# Patient Record
Sex: FEMALE | Race: WHITE | NOT HISPANIC OR LATINO | Employment: UNEMPLOYED | URBAN - METROPOLITAN AREA
[De-identification: names, ages, dates, MRNs, and addresses within clinical notes are randomized per-mention and may not be internally consistent; named-entity substitution may affect disease eponyms.]

---

## 2023-03-03 ENCOUNTER — TELEPHONE (OUTPATIENT)
Dept: CARDIOLOGY | Facility: MEDICAL CENTER | Age: 63
End: 2023-03-03

## 2023-03-03 NOTE — TELEPHONE ENCOUNTER
Called patient to request records for NP appointment with [facility]. Called to confirm if this will be first time patient is seeing a cardiologist and if the patient has had any recent cardiac imaging, testing, or lab work done outside of [facility]. No answer, unable to LVM due to mailbox being full.   Per referral to cardiology, patient had cardiac imaging/ testing done at [facility]. All records requested from [facility] at fax # [phone].

## 2023-03-09 ENCOUNTER — OFFICE VISIT (OUTPATIENT)
Dept: CARDIOLOGY | Facility: PHYSICIAN GROUP | Age: 63
End: 2023-03-09
Payer: MEDICAID

## 2023-03-09 VITALS
BODY MASS INDEX: 36.88 KG/M2 | DIASTOLIC BLOOD PRESSURE: 78 MMHG | HEIGHT: 67 IN | RESPIRATION RATE: 18 BRPM | SYSTOLIC BLOOD PRESSURE: 130 MMHG | OXYGEN SATURATION: 97 % | HEART RATE: 98 BPM | WEIGHT: 235 LBS

## 2023-03-09 DIAGNOSIS — I10 PRIMARY HYPERTENSION: ICD-10-CM

## 2023-03-09 DIAGNOSIS — I48.91 HYPERCOAGULABILITY DUE TO ATRIAL FIBRILLATION (HCC): ICD-10-CM

## 2023-03-09 DIAGNOSIS — D68.69 HYPERCOAGULABILITY DUE TO ATRIAL FIBRILLATION (HCC): ICD-10-CM

## 2023-03-09 DIAGNOSIS — F15.10 METHAMPHETAMINE ABUSE (HCC): ICD-10-CM

## 2023-03-09 DIAGNOSIS — I48.91 ATRIAL FIBRILLATION, UNSPECIFIED TYPE (HCC): ICD-10-CM

## 2023-03-09 DIAGNOSIS — Z72.0 TOBACCO USE: ICD-10-CM

## 2023-03-09 PROCEDURE — 99205 OFFICE O/P NEW HI 60 MIN: CPT | Mod: 25 | Performed by: INTERNAL MEDICINE

## 2023-03-09 PROCEDURE — 99406 BEHAV CHNG SMOKING 3-10 MIN: CPT | Performed by: INTERNAL MEDICINE

## 2023-03-09 RX ORDER — RIVAROXABAN 20 MG/1
20 TABLET, FILM COATED ORAL
COMMUNITY
Start: 2023-02-17 | End: 2023-03-09 | Stop reason: SDUPTHER

## 2023-03-09 RX ORDER — METOPROLOL SUCCINATE 25 MG/1
25 TABLET, EXTENDED RELEASE ORAL DAILY
Qty: 90 TABLET | Refills: 3 | Status: SHIPPED | OUTPATIENT
Start: 2023-03-09 | End: 2024-01-16 | Stop reason: SDUPTHER

## 2023-03-09 RX ORDER — PANTOPRAZOLE SODIUM 40 MG/1
40 TABLET, DELAYED RELEASE ORAL DAILY
COMMUNITY
Start: 2023-03-01

## 2023-03-09 RX ORDER — RIVAROXABAN 20 MG/1
20 TABLET, FILM COATED ORAL
Qty: 90 TABLET | Refills: 3 | Status: SHIPPED | OUTPATIENT
Start: 2023-03-09 | End: 2024-01-16 | Stop reason: SDUPTHER

## 2023-03-09 RX ORDER — AMLODIPINE BESYLATE 5 MG/1
5 TABLET ORAL
COMMUNITY
Start: 2023-02-01 | End: 2024-01-16 | Stop reason: SDUPTHER

## 2023-03-09 ASSESSMENT — ENCOUNTER SYMPTOMS
DIARRHEA: 0
DECREASED APPETITE: 0
DEPRESSION: 0
PALPITATIONS: 1
ABDOMINAL PAIN: 0
CONSTIPATION: 0
DYSPNEA ON EXERTION: 0
SHORTNESS OF BREATH: 0
PND: 0
WEIGHT LOSS: 0
VOMITING: 0
WEIGHT GAIN: 0
DIZZINESS: 0
NEAR-SYNCOPE: 0
HEARTBURN: 0
BLURRED VISION: 0
COUGH: 0
CLAUDICATION: 0
SYNCOPE: 0
FEVER: 0
BACK PAIN: 0
FLANK PAIN: 0
IRREGULAR HEARTBEAT: 0
ALTERED MENTAL STATUS: 0
ORTHOPNEA: 0
NAUSEA: 0

## 2023-03-09 NOTE — PROGRESS NOTES
"Cardiology Note    Chief Complaint   Patient presents with    Atrial Fibrillation       History of Present Illness: Miriam Choi is a 62 y.o. female PMH HTN who presents for initial visit. Referred by Dr Spivey for atrial fibrillation.     Per record from outpatient PCP visit ED visit 2/2023 for hypertension found AF which self converted to sinus.    Since visit still having some palpitations. Associated with fatigue. Palpitations a few times per day. Usually about a minute. Continues to smoke 1/2 ppd with history 30 pack years. Last used methamphetamines 3 days ago. Prior to that was sober and \"doing good.\" No other toxic social habits. No relevant family history.     Review of Systems   Constitutional: Negative for decreased appetite, fever, malaise/fatigue, weight gain and weight loss.   HENT:  Negative for congestion and nosebleeds.    Eyes:  Negative for blurred vision.   Cardiovascular:  Positive for palpitations. Negative for chest pain, claudication, dyspnea on exertion, irregular heartbeat, leg swelling, near-syncope, orthopnea, paroxysmal nocturnal dyspnea and syncope.   Respiratory:  Negative for cough and shortness of breath.    Endocrine: Negative for cold intolerance and heat intolerance.   Skin:  Negative for rash.   Musculoskeletal:  Negative for back pain.   Gastrointestinal:  Negative for abdominal pain, constipation, diarrhea, heartburn, melena, nausea and vomiting.   Genitourinary:  Negative for dysuria, flank pain and hematuria.   Neurological:  Negative for dizziness.   Psychiatric/Behavioral:  Negative for altered mental status and depression.          History reviewed. No pertinent surgical history.      Current Outpatient Medications   Medication Sig Dispense Refill    amLODIPine (NORVASC) 5 MG Tab Take 5 mg by mouth every day.      pantoprazole (PROTONIX) 40 MG Tablet Delayed Response Take 40 mg by mouth every day.      Carboxymethylcellulose Sodium (ARTIFICIAL TEARS OP) Administer  into " "affected eye(s).      NON SPECIFIED Indications: Biofreeze pain rub      metoprolol SR (TOPROL XL) 25 MG TABLET SR 24 HR Take 1 Tablet by mouth every day. 90 Tablet 3    XARELTO 20 MG Tab tablet Take 1 Tablet by mouth with dinner. 90 Tablet 3     No current facility-administered medications for this visit.         No Known Allergies      History reviewed. No pertinent family history.      Social History     Socioeconomic History    Marital status: Single     Spouse name: Not on file    Number of children: Not on file    Years of education: Not on file    Highest education level: Not on file   Occupational History    Not on file   Tobacco Use    Smoking status: Every Day     Years: 45.00     Types: Cigarettes    Smokeless tobacco: Never   Substance and Sexual Activity    Alcohol use: Yes     Alcohol/week: 1.2 oz     Types: 2 Standard drinks or equivalent per week    Drug use: Yes     Types: Methamphetamines, Marijuana     Comment: occ meth use    Sexual activity: Not on file   Other Topics Concern    Not on file   Social History Narrative    Not on file     Social Determinants of Health     Financial Resource Strain: Not on file   Food Insecurity: Not on file   Transportation Needs: Not on file   Physical Activity: Not on file   Stress: Not on file   Social Connections: Not on file   Intimate Partner Violence: Not on file   Housing Stability: Not on file         Physical Exam:  Ambulatory Vitals  /78 (BP Location: Left arm, Patient Position: Sitting, BP Cuff Size: Adult)   Pulse 98   Resp 18   Ht 1.702 m (5' 7\")   Wt 107 kg (235 lb)   SpO2 97%    BP Readings from Last 4 Encounters:   03/09/23 130/78     Weight/BMI:   Vitals:    03/09/23 1416   BP: 130/78   Weight: 107 kg (235 lb)   Height: 1.702 m (5' 7\")    Body mass index is 36.81 kg/m².  Wt Readings from Last 4 Encounters:   03/09/23 107 kg (235 lb)       Physical Exam  Constitutional:       General: She is not in acute distress.  HENT:      Head: " Normocephalic and atraumatic.   Eyes:      Conjunctiva/sclera: Conjunctivae normal.      Pupils: Pupils are equal, round, and reactive to light.   Neck:      Vascular: No JVD.   Cardiovascular:      Rate and Rhythm: Normal rate and regular rhythm.      Heart sounds: Normal heart sounds. No murmur heard.    No friction rub. No gallop.   Pulmonary:      Effort: Pulmonary effort is normal. No respiratory distress.      Breath sounds: Normal breath sounds. No wheezing or rales.   Chest:      Chest wall: No tenderness.   Abdominal:      General: Bowel sounds are normal. There is no distension.      Palpations: Abdomen is soft.   Musculoskeletal:      Cervical back: Normal range of motion and neck supple.   Skin:     General: Skin is warm and dry.   Neurological:      Mental Status: She is alert and oriented to person, place, and time.   Psychiatric:         Mood and Affect: Affect normal.         Judgment: Judgment normal.       Lab Data Review:  No results found for: CHOLSTRLTOT, LDL, HDL, TRIGLYCERIDE    No results found for: SODIUM, POTASSIUM, CHLORIDE, CO2, GLUCOSE, BUN, CREATININE, BUNCREATRAT, GLOMRATE  CrCl cannot be calculated (No successful lab value found.).  No results found for: ALKPHOSPHAT, ASTSGOT, ALTSGPT, TBILIRUBIN   No results found for: WBC, HCT  No results found for: HBA1C  No components found for: TROP      Cardiac Imaging and Procedures Review:      EKG 3/9/23 interpreted by me sinus arrhythmia, low voltage    Medical Decision Making:  Problem List Items Addressed This Visit       Atrial fibrillation (HCC)    Relevant Medications    amLODIPine (NORVASC) 5 MG Tab    metoprolol SR (TOPROL XL) 25 MG TABLET SR 24 HR    XARELTO 20 MG Tab tablet    Other Relevant Orders    EKG    EC-ECHOCARDIOGRAM COMPLETE W/O CONT    Holter Monitor Study    Primary hypertension    Relevant Medications    amLODIPine (NORVASC) 5 MG Tab    metoprolol SR (TOPROL XL) 25 MG TABLET SR 24 HR    XARELTO 20 MG Tab tablet     Methamphetamine abuse (HCC)    Hypercoagulability due to atrial fibrillation (HCC)    Relevant Medications    amLODIPine (NORVASC) 5 MG Tab    metoprolol SR (TOPROL XL) 25 MG TABLET SR 24 HR    XARELTO 20 MG Tab tablet    Tobacco use     Paroxysmal AF - add metoprolol. Continue doac for cva prevention; chadsvasc 2. Check TTE and event monitor.    HTN - goal 120/80. Continue amlodipine.    Tobacco use - discussed 4 min. She will attempt to quit. Not clear that she is ready.     It was my pleasure to meet with Ms. Choi.    A total of 67 minutes of time was spent on day of encounter reviewing medical record, performing history and examination, counseling, ordering medication/test/consults and documentation.

## 2023-03-15 ENCOUNTER — NON-PROVIDER VISIT (OUTPATIENT)
Dept: CARDIOLOGY | Facility: MEDICAL CENTER | Age: 63
End: 2023-03-15
Payer: MEDICAID

## 2023-03-15 DIAGNOSIS — I49.1 APC (ATRIAL PREMATURE CONTRACTIONS): ICD-10-CM

## 2023-03-15 DIAGNOSIS — I48.91 ATRIAL FIBRILLATION, UNSPECIFIED TYPE (HCC): ICD-10-CM

## 2023-03-15 DIAGNOSIS — I49.3 PVC'S (PREMATURE VENTRICULAR CONTRACTIONS): ICD-10-CM

## 2023-03-15 NOTE — PROGRESS NOTES
Home enrollment completed in the 14 day [employer] heart monitoring program, per Fahad Rogers M.D.  Monitor will be shipped to patient via [facility].  >Pending Baseline.  >Pending EOS.

## 2023-03-17 ENCOUNTER — TELEPHONE (OUTPATIENT)
Dept: CARDIOLOGY | Facility: MEDICAL CENTER | Age: 63
End: 2023-03-17

## 2023-03-17 NOTE — TELEPHONE ENCOUNTER
----- Message from Yolanda Sarabia sent at 3/17/2023  1:31 PM PDT -----  Regarding: [employer] Patch order  David Abdullahi, can you please change this order to a [employer] per Insurance. I would think 14 days would still be suffice, same charge 3-30 days. The referral dept is awaiting this change. Thank you, miss you already.

## 2023-04-03 ENCOUNTER — TELEPHONE (OUTPATIENT)
Dept: CARDIOLOGY | Facility: MEDICAL CENTER | Age: 63
End: 2023-04-03

## 2023-04-03 NOTE — TELEPHONE ENCOUNTER
"Phone Number Called: [phone]    Call outcome: Spoke to patient regarding message below.    Message:   Spoke to pt and she stated that her heart was \"racing\" and she was breathing \"heavy\" at the time that it happened. She states that she is feeling much better now and is not having any symptoms. She stated that she is in the middle of moving right now, and maybe it was related to that. She states that she is continuing to smoke about 1/2 ppd and denies methamphetamine use recently since last seeing [facility] 03/09/2023. Confirms that she is still taking her xarelto, metoprolol, and amlodipine as prescribed.       To DA for VR per care team ([facility] is OOO until 4/11): please review monitor results and advise if any further recommendations in VR's absence. Thank you  "

## 2023-04-04 NOTE — TELEPHONE ENCOUNTER
Reviewed.  Since this was a brief episode, no changes to plan of care.  Continue anticoagulation.  Thanks.

## 2023-04-04 NOTE — TELEPHONE ENCOUNTER
Phone Number Called: [phone]    Call outcome: Spoke to patient regarding message below.    Message:   Relayed DA's recommendation to keep taking current anticoagulation and cardiac meds as prescribed and no changes to plan of care. Pt verbalized understanding and was very appreciative of call back.

## 2023-04-06 ENCOUNTER — TELEPHONE (OUTPATIENT)
Dept: CARDIOLOGY | Facility: MEDICAL CENTER | Age: 63
End: 2023-04-06

## 2023-04-06 PROCEDURE — 93268 ECG RECORD/REVIEW: CPT | Performed by: INTERNAL MEDICINE

## 2023-07-14 ENCOUNTER — HOSPITAL ENCOUNTER (OUTPATIENT)
Dept: RADIOLOGY | Facility: MEDICAL CENTER | Age: 63
End: 2023-07-14
Attending: INTERNAL MEDICINE
Payer: MEDICAID

## 2023-07-21 ENCOUNTER — HOSPITAL ENCOUNTER (OUTPATIENT)
Dept: RADIOLOGY | Facility: MEDICAL CENTER | Age: 63
End: 2023-07-21
Attending: INTERNAL MEDICINE
Payer: MEDICAID

## 2023-07-21 ENCOUNTER — HOSPITAL ENCOUNTER (OUTPATIENT)
Dept: CARDIOLOGY | Facility: MEDICAL CENTER | Age: 63
End: 2023-07-21
Attending: INTERNAL MEDICINE
Payer: MEDICAID

## 2023-07-21 DIAGNOSIS — Z12.31 VISIT FOR SCREENING MAMMOGRAM: ICD-10-CM

## 2023-07-21 DIAGNOSIS — I48.91 ATRIAL FIBRILLATION, UNSPECIFIED TYPE (HCC): ICD-10-CM

## 2023-07-21 PROCEDURE — 77063 BREAST TOMOSYNTHESIS BI: CPT

## 2023-07-21 PROCEDURE — 93306 TTE W/DOPPLER COMPLETE: CPT

## 2023-07-24 LAB
LV EJECT FRACT  99904: 60
LV EJECT FRACT MOD 2C 99903: 65.03
LV EJECT FRACT MOD 4C 99902: 54.82
LV EJECT FRACT MOD BP 99901: 60.69

## 2023-07-24 PROCEDURE — 93306 TTE W/DOPPLER COMPLETE: CPT | Mod: 26 | Performed by: INTERNAL MEDICINE

## 2024-01-16 DIAGNOSIS — I48.91 ATRIAL FIBRILLATION, UNSPECIFIED TYPE (HCC): ICD-10-CM

## 2024-01-16 DIAGNOSIS — I10 PRIMARY HYPERTENSION: ICD-10-CM

## 2024-01-16 RX ORDER — METOPROLOL SUCCINATE 25 MG/1
25 TABLET, EXTENDED RELEASE ORAL DAILY
Qty: 90 TABLET | Refills: 0 | Status: SHIPPED | OUTPATIENT
Start: 2024-01-16

## 2024-01-16 RX ORDER — RIVAROXABAN 20 MG/1
20 TABLET, FILM COATED ORAL
Qty: 90 TABLET | Refills: 0 | Status: SHIPPED | OUTPATIENT
Start: 2024-01-16

## 2024-01-16 RX ORDER — AMLODIPINE BESYLATE 5 MG/1
5 TABLET ORAL
Qty: 90 TABLET | Refills: 0 | Status: SHIPPED | OUTPATIENT
Start: 2024-01-16

## 2024-04-29 ENCOUNTER — OFFICE VISIT (OUTPATIENT)
Dept: CARDIOLOGY | Facility: PHYSICIAN GROUP | Age: 64
End: 2024-04-29
Payer: MEDICAID

## 2024-04-29 VITALS
RESPIRATION RATE: 19 BRPM | BODY MASS INDEX: 36.41 KG/M2 | WEIGHT: 232 LBS | HEIGHT: 67 IN | OXYGEN SATURATION: 96 % | DIASTOLIC BLOOD PRESSURE: 60 MMHG | HEART RATE: 93 BPM | SYSTOLIC BLOOD PRESSURE: 138 MMHG

## 2024-04-29 DIAGNOSIS — I48.91 ATRIAL FIBRILLATION, UNSPECIFIED TYPE (HCC): ICD-10-CM

## 2024-04-29 DIAGNOSIS — I10 PRIMARY HYPERTENSION: ICD-10-CM

## 2024-04-29 RX ORDER — HYDROCHLOROTHIAZIDE 25 MG/1
25 TABLET ORAL DAILY
Qty: 90 TABLET | Refills: 3 | Status: SHIPPED | OUTPATIENT
Start: 2024-04-29

## 2024-04-29 ASSESSMENT — ENCOUNTER SYMPTOMS
ALTERED MENTAL STATUS: 0
DIARRHEA: 0
DYSPNEA ON EXERTION: 0
CONSTIPATION: 0
WEIGHT GAIN: 0
COUGH: 0
SHORTNESS OF BREATH: 0
VOMITING: 0
HEARTBURN: 0
NEAR-SYNCOPE: 0
NAUSEA: 0
PND: 0
DEPRESSION: 0
PALPITATIONS: 1
FLANK PAIN: 0
ORTHOPNEA: 0
BLURRED VISION: 0
SYNCOPE: 0
ABDOMINAL PAIN: 0
WEIGHT LOSS: 0
DIZZINESS: 0
FEVER: 0
IRREGULAR HEARTBEAT: 0
BACK PAIN: 0
CLAUDICATION: 0
DECREASED APPETITE: 0

## 2024-04-29 NOTE — PROGRESS NOTES
Cardiology Note    Chief Complaint   Patient presents with    Atrial Fibrillation       History of Present Illness: Miriam Choi is a 63 y.o. female PMH HTN, paroxysmal AF, tobacco use who presents for follow up visit. Initially referred by Dr Spivey for atrial fibrillation.     Without cardiac complaints. She stopped her anticoagulation temporarily however does plan to resume. Has been having some leg edema for some time which seems to persist. No associated symptoms; especially no heart failure symptoms. Tragically she found her son hanged himself in back yard today. She has family for support.     Per record from outpatient PCP visit ED visit 2/2023 for hypertension found AF which self converted to sinus.      Review of Systems   Constitutional: Negative for decreased appetite, fever, malaise/fatigue, weight gain and weight loss.   HENT:  Negative for congestion and nosebleeds.    Eyes:  Negative for blurred vision.   Cardiovascular:  Positive for palpitations. Negative for chest pain, claudication, dyspnea on exertion, irregular heartbeat, leg swelling, near-syncope, orthopnea, paroxysmal nocturnal dyspnea and syncope.   Respiratory:  Negative for cough and shortness of breath.    Endocrine: Negative for cold intolerance and heat intolerance.   Skin:  Negative for rash.   Musculoskeletal:  Negative for back pain.   Gastrointestinal:  Negative for abdominal pain, constipation, diarrhea, heartburn, melena, nausea and vomiting.   Genitourinary:  Negative for dysuria, flank pain and hematuria.   Neurological:  Negative for dizziness.   Psychiatric/Behavioral:  Negative for altered mental status and depression.            History reviewed. No pertinent surgical history.      Current Outpatient Medications   Medication Sig Dispense Refill    hydroCHLOROthiazide 25 MG Tab Take 1 Tablet by mouth every day. 90 Tablet 3    amLODIPine (NORVASC) 5 MG Tab Take 1 Tablet by mouth every day. 90 Tablet 0    metoprolol SR  "(TOPROL XL) 25 MG TABLET SR 24 HR Take 1 Tablet by mouth every day. 90 Tablet 0    pantoprazole (PROTONIX) 40 MG Tablet Delayed Response Take 40 mg by mouth every day.      Carboxymethylcellulose Sodium (ARTIFICIAL TEARS OP) Administer  into affected eye(s).      XARELTO 20 MG Tab tablet Take 1 Tablet by mouth with dinner. (Patient not taking: Reported on 4/29/2024) 90 Tablet 0    NON SPECIFIED Indications: Biofreeze pain rub       No current facility-administered medications for this visit.         No Known Allergies      History reviewed. No pertinent family history.      Social History     Socioeconomic History    Marital status: Single     Spouse name: Not on file    Number of children: Not on file    Years of education: Not on file    Highest education level: Not on file   Occupational History    Not on file   Tobacco Use    Smoking status: Every Day     Types: Cigarettes    Smokeless tobacco: Never   Substance and Sexual Activity    Alcohol use: Yes     Alcohol/week: 1.2 oz     Types: 2 Standard drinks or equivalent per week    Drug use: Yes     Types: Methamphetamines, Marijuana     Comment: occ meth use    Sexual activity: Not on file   Other Topics Concern    Not on file   Social History Narrative    Not on file     Social Determinants of Health     Financial Resource Strain: Not on file   Food Insecurity: Not on file   Transportation Needs: Not on file   Physical Activity: Not on file   Stress: Not on file   Social Connections: Not on file   Intimate Partner Violence: Not on file   Housing Stability: Not on file         Physical Exam:  Ambulatory Vitals  /60 (BP Location: Left arm, Patient Position: Sitting, BP Cuff Size: Adult)   Pulse 93   Resp 19   Ht 1.702 m (5' 7\")   Wt 105 kg (232 lb)   SpO2 96%    BP Readings from Last 4 Encounters:   04/29/24 138/60   03/09/23 130/78     Weight/BMI:   Vitals:    04/29/24 1511   BP: 138/60   Weight: 105 kg (232 lb)   Height: 1.702 m (5' 7\")    Body mass " "index is 36.34 kg/m².  Wt Readings from Last 4 Encounters:   04/29/24 105 kg (232 lb)   03/09/23 107 kg (235 lb)       Physical Exam  Constitutional:       General: She is not in acute distress.  HENT:      Head: Normocephalic and atraumatic.   Eyes:      Conjunctiva/sclera: Conjunctivae normal.      Pupils: Pupils are equal, round, and reactive to light.   Neck:      Vascular: No JVD.   Cardiovascular:      Rate and Rhythm: Normal rate and regular rhythm.      Heart sounds: Normal heart sounds. No murmur heard.     No friction rub. No gallop.   Pulmonary:      Effort: Pulmonary effort is normal. No respiratory distress.      Breath sounds: Normal breath sounds. No wheezing or rales.   Chest:      Chest wall: No tenderness.   Abdominal:      General: Bowel sounds are normal. There is no distension.      Palpations: Abdomen is soft.   Musculoskeletal:      Cervical back: Normal range of motion and neck supple.   Skin:     General: Skin is warm and dry.   Neurological:      Mental Status: She is alert and oriented to person, place, and time.   Psychiatric:         Mood and Affect: Affect normal.         Judgment: Judgment normal.         Lab Data Review:  No results found for: \"CHOLSTRLTOT\", \"LDL\", \"HDL\", \"TRIGLYCERIDE\"    No results found for: \"SODIUM\", \"POTASSIUM\", \"CHLORIDE\", \"CO2\", \"GLUCOSE\", \"BUN\", \"CREATININE\", \"BUNCREATRAT\", \"GLOMRATE\"  CrCl cannot be calculated (No successful lab value found.).  No results found for: \"ALKPHOSPHAT\", \"ASTSGOT\", \"ALTSGPT\", \"TBILIRUBIN\"   No results found for: \"WBC\", \"HCT\"  No results found for: \"HBA1C\"  No components found for: \"TROP\"      Cardiac Imaging and Procedures Review:      EKG 3/9/23 interpreted by me sinus arrhythmia, low voltage    DOS: 4/6/2023   Event Monitor Summary:  Time Monitored 10.5 days     1. Sinus rhythm with heart rate range from 50 to 195 bpm. Average heart rate 82 bpm.   2. Normal sinus node and AV node conduction normal. No pauses.   3. 1% PACs.   4. 1% " PVCs.   5. Atrial fibrillation noted with longest lasting 4 minutes, average  bpm.   6. Patient triggers of heart racing/short of breath were associated with sinus rhythm with HR range  BPM with occasional PACs.     TTE 7/21/23  CONCLUSIONS  Moderate concentric left ventricular hypertrophy. Normal left   ventricular size and systolic function.  The right ventricle is normal in size and systolic function.  The left atrium is normal in size.  Mild tricuspid regurgitation.  No pericardial effusion.    Medical Decision Making:  Problem List Items Addressed This Visit       Atrial fibrillation (HCC)    Relevant Medications    hydroCHLOROthiazide 25 MG Tab    Primary hypertension    Relevant Medications    hydroCHLOROthiazide 25 MG Tab    Other Relevant Orders    Basic Metabolic Panel       Paroxysmal AF - continue rate control with metoprolol. Continue doac for cva prevention; chadsvasc 2.     HTN - goal <130/80. Continue amlodipine. Add hctz to help with edema. Repeat albs.     It was my pleasure to meet with Ms. Choi.

## 2024-05-07 DIAGNOSIS — I10 PRIMARY HYPERTENSION: ICD-10-CM

## 2024-05-07 NOTE — TELEPHONE ENCOUNTER
Is the patient due for a refill? Yes    Was the patient seen the past year? Yes    Date of last office visit: 04/09/24    Does the patient have an upcoming appointment?  Yes   If yes, When? 09/30/24    Provider to refill:[facility]    Does the patients insurance require a 100 day supply?  No

## 2024-05-08 RX ORDER — METOPROLOL SUCCINATE 25 MG/1
25 TABLET, EXTENDED RELEASE ORAL DAILY
Qty: 90 TABLET | Refills: 0 | Status: SHIPPED | OUTPATIENT
Start: 2024-05-08

## 2024-05-08 RX ORDER — AMLODIPINE BESYLATE 5 MG/1
5 TABLET ORAL
Qty: 90 TABLET | Refills: 0 | Status: SHIPPED | OUTPATIENT
Start: 2024-05-08

## 2024-09-17 ENCOUNTER — TELEPHONE (OUTPATIENT)
Dept: CARDIOLOGY | Facility: MEDICAL CENTER | Age: 64
End: 2024-09-17
Payer: MEDICAID

## 2024-09-17 NOTE — TELEPHONE ENCOUNTER
Called pt in regards to lab work that was ordered at previous OV. I could not reach the patient. Pt has follow up appointment scheduled with [facility] on 09.30.2024.

## 2024-09-30 ENCOUNTER — APPOINTMENT (OUTPATIENT)
Dept: CARDIOLOGY | Facility: PHYSICIAN GROUP | Age: 64
End: 2024-09-30
Payer: MEDICAID

## 2025-08-18 ENCOUNTER — APPOINTMENT (OUTPATIENT)
Dept: RADIOLOGY | Facility: MEDICAL CENTER | Age: 65
End: 2025-08-18
Attending: INTERNAL MEDICINE
Payer: MEDICAID

## 2025-08-18 ENCOUNTER — APPOINTMENT (OUTPATIENT)
Dept: RADIOLOGY | Facility: MEDICAL CENTER | Age: 65
End: 2025-08-18
Attending: INTERNAL MEDICINE

## 2025-08-18 ENCOUNTER — APPOINTMENT (OUTPATIENT)
Dept: MEDICAL GROUP | Facility: MEDICAL CENTER | Age: 65
End: 2025-08-18
Payer: MEDICAID

## 2025-08-18 DIAGNOSIS — Z12.31 VISIT FOR SCREENING MAMMOGRAM: ICD-10-CM
